# Patient Record
Sex: FEMALE | Race: WHITE | NOT HISPANIC OR LATINO
[De-identification: names, ages, dates, MRNs, and addresses within clinical notes are randomized per-mention and may not be internally consistent; named-entity substitution may affect disease eponyms.]

---

## 2018-03-20 PROBLEM — Z00.00 ENCOUNTER FOR PREVENTIVE HEALTH EXAMINATION: Status: ACTIVE | Noted: 2018-03-20

## 2018-03-21 ENCOUNTER — APPOINTMENT (OUTPATIENT)
Dept: PULMONOLOGY | Facility: CLINIC | Age: 33
End: 2018-03-21
Payer: COMMERCIAL

## 2018-03-21 VITALS
HEART RATE: 84 BPM | DIASTOLIC BLOOD PRESSURE: 80 MMHG | SYSTOLIC BLOOD PRESSURE: 120 MMHG | BODY MASS INDEX: 27.6 KG/M2 | OXYGEN SATURATION: 97 % | WEIGHT: 150 LBS | HEIGHT: 62 IN | TEMPERATURE: 97.3 F

## 2018-03-21 DIAGNOSIS — Z78.9 OTHER SPECIFIED HEALTH STATUS: ICD-10-CM

## 2018-03-21 DIAGNOSIS — R09.1 PLEURISY: ICD-10-CM

## 2018-03-21 PROCEDURE — 99242 OFF/OP CONSLTJ NEW/EST SF 20: CPT | Mod: 25

## 2018-03-21 PROCEDURE — 94010 BREATHING CAPACITY TEST: CPT

## 2018-03-21 RX ORDER — DICLOFENAC SODIUM 50 MG/1
50 TABLET, DELAYED RELEASE ORAL
Qty: 14 | Refills: 0 | Status: ACTIVE | COMMUNITY
Start: 2018-03-21 | End: 1900-01-01

## 2018-04-04 ENCOUNTER — APPOINTMENT (OUTPATIENT)
Dept: PULMONOLOGY | Facility: CLINIC | Age: 33
End: 2018-04-04
Payer: COMMERCIAL

## 2018-04-04 VITALS
WEIGHT: 150 LBS | SYSTOLIC BLOOD PRESSURE: 120 MMHG | DIASTOLIC BLOOD PRESSURE: 80 MMHG | OXYGEN SATURATION: 97 % | HEIGHT: 62 IN | HEART RATE: 84 BPM | BODY MASS INDEX: 27.6 KG/M2

## 2018-04-04 PROCEDURE — 99213 OFFICE O/P EST LOW 20 MIN: CPT | Mod: 25

## 2018-04-04 PROCEDURE — 94010 BREATHING CAPACITY TEST: CPT

## 2018-04-04 PROCEDURE — 71046 X-RAY EXAM CHEST 2 VIEWS: CPT

## 2019-02-27 LAB — DEPRECATED D DIMER PPP IA-ACNC: <150 NG/ML DDU

## 2019-09-13 ENCOUNTER — RESULT REVIEW (OUTPATIENT)
Age: 34
End: 2019-09-13

## 2019-11-18 ENCOUNTER — APPOINTMENT (OUTPATIENT)
Dept: ORTHOPEDIC SURGERY | Facility: CLINIC | Age: 34
End: 2019-11-18
Payer: COMMERCIAL

## 2019-11-18 VITALS — HEIGHT: 62 IN | BODY MASS INDEX: 26.68 KG/M2 | WEIGHT: 145 LBS

## 2019-11-18 DIAGNOSIS — Z72.3 LACK OF PHYSICAL EXERCISE: ICD-10-CM

## 2019-11-18 DIAGNOSIS — M54.5 LOW BACK PAIN: ICD-10-CM

## 2019-11-18 PROCEDURE — 99203 OFFICE O/P NEW LOW 30 MIN: CPT

## 2019-11-18 PROCEDURE — 72100 X-RAY EXAM L-S SPINE 2/3 VWS: CPT

## 2019-11-18 RX ORDER — TRAMADOL HYDROCHLORIDE 50 MG/1
50 TABLET, COATED ORAL
Qty: 20 | Refills: 0 | Status: ACTIVE | COMMUNITY
Start: 2019-09-12

## 2019-11-18 RX ORDER — DICLOFENAC SODIUM 75 MG/1
75 TABLET, DELAYED RELEASE ORAL
Qty: 60 | Refills: 1 | Status: ACTIVE | COMMUNITY
Start: 2019-11-18 | End: 1900-01-01

## 2019-11-18 RX ORDER — NAPROXEN 500 MG/1
500 TABLET ORAL
Qty: 30 | Refills: 0 | Status: ACTIVE | COMMUNITY
Start: 2019-09-12

## 2019-11-18 RX ORDER — FLUCONAZOLE 150 MG/1
150 TABLET ORAL
Qty: 6 | Refills: 0 | Status: ACTIVE | COMMUNITY
Start: 2019-06-28

## 2019-11-18 NOTE — ASSESSMENT
[FreeTextEntry1] : 34 year old female with low back pain.  She has no radicular pain and is neurologically intact.  Her MRI Lspine did not show any abnormalities.  Her lumbar radiographs did not show any dynamic instability.  She has not been to physical therapy.  We provided her with a referral for physical therapy.  We also provided her with a prescription for diclofenac.  She knows not to take any other NSAIDs with diclofenac and discontinue it if GI symptoms occur.  All her questions were answered.  She will call to make a followup appointment. She knows to call if she has any questions or concerns or if her symptoms acutely worsen.

## 2019-11-18 NOTE — PHYSICAL EXAM
[de-identified] : General: No acute distress, conversant, well-nourished.\par Head: Normocephalic, atraumatic\par Neck: trachea midline, FROM\par Heart: normotensive and normal rate and rhythm\par Lungs: No labored breathing\par Skin: No abrasions, no rashes, no edema\par Psych: Alert and oriented to person, place and time\par Extremities: no peripheral edema or digital cyanosis\par Gait: Normal gait. Can perform tandem gait.  \par Vascular: warm and well perfused distally, palpable distal pulses\par \par MSK:\par Lumbar spine:\par Alignment normal.\par Tenderness to palpation in low back.  No step-off, no deformity.\par Low back pain but no neurologic symptoms with full active range of motion of lumbar spine (flexion, extension, lateral bending and rotation).\par \par NEURO EXAM:\par Sensation \par Left L2  -  2/2            \par Left L3  -  2/2\par Left L4  -  2/2\par Left L5  -  2/2\par Left S1  -  2/2\par \par Right L2  -  2/2            \par Right L3  -  2/2\par Right L4  -  2/2\par Right L5  -  2/2\par Right S1  -  2/2\par \par Motor: \par Left L2 (hip flexion)                            5/5                \par Left L3 (knee extension)                   5/5                \par Left L4 (ankle dosiflexion)                 5/5                \par Left L5 (long toe extensor)                5/5                \par Left S1 (ankle plantar flexion)           5/5\par \par Right L2 (hip flexion)                            5/5                \par Right L3 (knee extension)                   5/5                \par Right L4 (ankle dosiflexion)                 5/5                \par Right L5 (long toe extensor)                5/5                \par Right S1 (ankle plantar flexion)           5/5\par \par Reflexes: Normal and symmetric\par Negative clonus.  Down-going Babinski.  \par \par  [de-identified] : Lumbar radiographs including flexion and extension views obtained in the office today shows no fracture or dislocation.  There is no evidence of dynamic instability.  \par \par MRI L spine (9/11/19): No disc herniations, no canal or foraminal narrowing.

## 2019-11-18 NOTE — HISTORY OF PRESENT ILLNESS
[de-identified] : 34 year old female presents with low back pain that is interfering with her ability to sleep. It feels better when she stands but worse when she lays down.  She says, "It feels like my spine is going to snap."  She denies any radicular pain, numbness, paresthesias, weakness, balance problems or urinary symptoms. She was previously seen by Dr. Winn and at that time she did have some paresthesias in her right leg but those have completely resolved.  She has been taking naproxen which doesn't help.  She tried tramadol which doesn't help.

## 2019-11-18 NOTE — HISTORY OF PRESENT ILLNESS
[de-identified] : 34 year old female presents with low back pain that is interfering with her ability to sleep. It feels better when she stands but worse when she lays down.  She says, "It feels like my spine is going to snap."  She denies any radicular pain, numbness, paresthesias, weakness, balance problems or urinary symptoms. She was previously seen by Dr. Winn and at that time she did have some paresthesias in her right leg but those have completely resolved.  She has been taking naproxen which doesn't help.  She tried tramadol which doesn't help.

## 2024-06-20 ENCOUNTER — APPOINTMENT (OUTPATIENT)
Dept: ULTRASOUND IMAGING | Facility: CLINIC | Age: 39
End: 2024-06-20

## 2024-06-20 ENCOUNTER — APPOINTMENT (OUTPATIENT)
Dept: MAMMOGRAPHY | Facility: CLINIC | Age: 39
End: 2024-06-20
Payer: COMMERCIAL

## 2024-06-20 PROCEDURE — 77063 BREAST TOMOSYNTHESIS BI: CPT | Mod: 59

## 2024-06-20 PROCEDURE — 77065 DX MAMMO INCL CAD UNI: CPT | Mod: GG,RT

## 2024-06-20 PROCEDURE — 77067 SCR MAMMO BI INCL CAD: CPT | Mod: 59

## 2024-06-20 PROCEDURE — 76641 ULTRASOUND BREAST COMPLETE: CPT | Mod: 50

## 2024-10-03 ENCOUNTER — APPOINTMENT (OUTPATIENT)
Dept: ORTHOPEDIC SURGERY | Facility: CLINIC | Age: 39
End: 2024-10-03
Payer: SELF-PAY

## 2024-10-03 DIAGNOSIS — M94.262 CHONDROMALACIA, LEFT KNEE: ICD-10-CM

## 2024-10-03 PROCEDURE — 73562 X-RAY EXAM OF KNEE 3: CPT | Mod: LT

## 2024-10-03 PROCEDURE — 99203 OFFICE O/P NEW LOW 30 MIN: CPT

## 2024-10-03 RX ORDER — NABUMETONE 500 MG/1
500 TABLET, FILM COATED ORAL
Qty: 60 | Refills: 1 | Status: ACTIVE | COMMUNITY
Start: 2024-10-03 | End: 1900-01-01

## 2024-10-03 NOTE — PHYSICAL EXAM
[de-identified] : Left knee  Constitutional:  The patient is healthy-appearing and in no apparent distress.   Gait: The patient ambulates with a normal gait and no limp.  Cardiovascular System:  The capillary refill is less than 2 seconds.   Skin:  There are no skin abnormalities.  Left Knee:   Bony Palpation:  There is no tenderness of the medial joint line.  There is no tenderness of the lateral joint line. There is no tenderness of the medial femoral chondyle. There is no tenderness of the lateral femoral chondyle. There is no tenderness of the tibial tubercle. There is no tenderness of the superior patella. There is no tenderness of the inferior patella. There is tenderness of the medial patellar facet. There is tenderness of the lateral patellar facet.  Soft Tissue Palpation:  There is no tenderness of the medial retinaculum. There is no tenderness of the lateral retinaculum. There is no tenderness of the quadriceps tendon. There is no tenderness of the patella tendon. There is no tenderness of the ITB. There is no tenderness of the pes anserine.  Active Range of Motion:  The range of motion at the knee actively and passively is full.  There is lateral patellar retinaculum tightness/hypomobility.  Special Tests:  There is a negative Apley. There is a negative Steinmanns.  There is a negative Lachman and Anterior Drawer. There is a negative Posterior Drawer.   There is no varus or valgus laxity.  Strength:  There is 5/5 hip flexion and 5/5 knee flexion and extension.    Psychiatric:  The patient demonstrates a normal mood and affect and is active and alert.  Alignment: There is external tibial rotation and femoral anteversion.      [de-identified] : X-ray left knee: There is no significant bony / soft tissue abnormality, arthritis, or fracture except mild patellar tilt

## 2024-10-03 NOTE — HISTORY OF PRESENT ILLNESS
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

## 2024-10-03 NOTE — ASSESSMENT
[FreeTextEntry1] : Discussed at length with patient exam history and imaging as well as treatment options and at this time patient elects home exercises and observation if no symptom improvement she will follow-up on as-needed basis

## 2025-04-11 NOTE — PHYSICAL EXAM
Chief Complaint  Follow-up and Pain (Patient states she has pain in left  shoulder, left knee and left  heel. )    Subjective        Pauline Sung presents to [facility]  History of Present Illness  The patient presents for evaluation of plantar fasciitis, lupus, and health maintenance.    She reports a recurrence of heel pain, which she attributes to her long-standing diagnosis of plantar fasciitis. The pain is severe enough to disrupt her gait during bathroom visits. She has been managing this condition for approximately 10 years, with previous treatments including injections from Dr. Parrish and a podiatrist, which provided temporary relief. She also mentions a history of cellulitis 12 to 13 years ago, which has since led to ongoing issues.    She expresses concern about potential lupus flare-ups, as suggested by her daughter, and has been using [facility] for relief. She also reports shoulder pain, which she suspects may be due to sleeping in an awkward position. Additionally, she experiences knee pain.    She has undergone several mammograms and plans to repeat the procedure in a few years. She retains her uterus and believes her last Pap smear was conducted in 2021. She is due for a colonoscopy this year. She is currently on a regimen of baby aspirin (81 mg) and uses a heating pad for comfort.    FAMILY HISTORY  Her aunt  of breast cancer. Her sister had breast cancer. She has 2 nieces who had breast cancer and underwent breast removal.    MEDICATIONS  Current: levothyroxine, atorvastatin, baby aspirin        Medical History: has a past medical history of Allergic, Cellulitis, Cellulitis, Cerebral vascular disease, Family history of thyroid problem, Gall stones, Hypothyroidism, Kidney stone, Lupus, Migraines, Plantar fasciitis, and Thrombosis.   Surgical History: has a past surgical history that includes Cholecystectomy; Appendectomy; Colonoscopy (2019);  "Colonoscopy (N/A, 12/01/2022); and Mohs surgery (05/20/2024).   Family History: family history includes Cancer in her brother, brother, father, sister, and sister; Clotting disorder in her father; Colon cancer in her brother; Diabetes in her brother; Diverticulitis in her brother; Heart disease in her brother, father, and sister; No Known Problems in her daughter, daughter, maternal grandfather, maternal grandmother, mother, paternal grandfather, paternal grandmother, son, son, and son; Other in her brother, brother, sister, and sister.   Social History: reports that she has never smoked. She has never been exposed to tobacco smoke. She has never used smokeless tobacco. She reports that she does not drink alcohol and does not use drugs.  Immunization History   Administered Date(s) Administered    ABRYSVO (RSV, 60+ or pregnant women 32-36 wks) 10/02/2023    COVID-19 (MODERNA) 12YRS+ (SPIKEVAX) 10/02/2023, 09/25/2024    COVID-19 (MODERNA) 1st,2nd,3rd Dose Monovalent 03/03/2021, 03/31/2021, 12/02/2021    COVID-19 (MODERNA) BIVALENT 12+YRS 11/02/2022    Covid-19 (Pfizer) Gray Cap Monovalent 06/01/2022    FLUAD TRI 65YR+ 10/10/2019, 10/10/2019    Flu Vaccine Intradermal Quad 18-64YR 10/10/2017    Fluad Quad 65+ 10/10/2019, 10/13/2020, 10/11/2022    Fluzone High-Dose 65+YRS 09/25/2024    Fluzone High-Dose 65+yrs 10/21/2021, 10/03/2022, 10/02/2023    Hep B, Unspecified 05/06/1996    Pneumococcal Conjugate 13-Valent (PCV13) 01/05/2016    Pneumococcal Conjugate 20-Valent (PCV20) 07/15/2022    Pneumococcal Polysaccharide (PPSV23) 12/07/2010    Shingrix 05/15/2021, 08/16/2021, 08/16/2021    Tdap 10/05/2012, 08/04/2022    Zostavax 08/11/2014    flucelvax quad pfs =>4 YRS 09/17/2018    influenza Split 10/01/2016       Objective   Vital Signs:  /78   Pulse 71   Temp 97.9 °F (36.6 °C) (Oral)   Ht 170.2 cm (67\")   Wt 94.9 kg (209 lb 3.2 oz)   SpO2 96%   BMI 32.77 kg/m²   Estimated body mass index is 32.77 kg/m² as " "calculated from the following:    Height as of this encounter: 170.2 cm (67\").    Weight as of this encounter: 94.9 kg (209 lb 3.2 oz).             ROS:  Review of Systems   Constitutional:  Negative for fatigue and fever.   HENT:  Negative for congestion, ear pain and sinus pressure.    Respiratory:  Negative for cough, chest tightness and shortness of breath.    Cardiovascular:  Negative for chest pain, palpitations and leg swelling.   Gastrointestinal:  Negative for abdominal pain and diarrhea.   Genitourinary:  Negative for dysuria and frequency.   Neurological:  Negative for speech difficulty, headache and confusion.   Psychiatric/Behavioral:  Negative for agitation and behavioral problems.       Physical Exam  Vitals reviewed.   Constitutional:       Appearance: Normal appearance.   HENT:      Right Ear: Tympanic membrane normal.      Left Ear: Tympanic membrane normal.      Nose: Nose normal.   Eyes:      Extraocular Movements: Extraocular movements intact.      Conjunctiva/sclera: Conjunctivae normal.      Pupils: Pupils are equal, round, and reactive to light.   Cardiovascular:      Rate and Rhythm: Normal rate and regular rhythm.   Pulmonary:      Effort: Pulmonary effort is normal.      Breath sounds: Normal breath sounds.   Abdominal:      General: Bowel sounds are normal.   Musculoskeletal:         General: Normal range of motion.      Cervical back: Normal range of motion.   Skin:     General: Skin is warm and dry.   Neurological:      General: No focal deficit present.      Mental Status: She is alert and oriented to person, place, and time.   Psychiatric:         Mood and Affect: Mood normal.         Behavior: Behavior normal.       Physical Exam  Lungs are clear.  Heart is steady with no skips.      Result Review     The following data was reviewed by: Ana Chapman MD on 04/11/2025:  Common labs          6/10/2024    14:28 12/12/2024    11:39 4/11/2025    13:19   Common Labs   Glucose 88  105  " [de-identified] : Lumbar radiographs including flexion and extension views obtained in the office today shows no fracture or dislocation.  There is no evidence of dynamic instability.  \par \par MRI L spine (9/11/19): No disc herniations, no canal or foraminal narrowing.   97    BUN 19  21  23    Creatinine 0.59  0.84  0.89    Sodium 141  140  140    Potassium 3.9  4.1  4.9    Chloride 111  111  109    Calcium 8.9  9.4  9.8    Albumin 3.9  4.0  4.2    Total Bilirubin <0.2  0.3  0.3    Alkaline Phosphatase 92  91  83    AST (SGOT) 27  23  23    ALT (SGPT) 32  20  21    WBC  5.18     Hemoglobin  13.2     Hematocrit  40.9     Platelets  189     Total Cholesterol  122     Triglycerides  123     HDL Cholesterol  51     LDL Cholesterol   49     Hemoglobin A1C 5.60        Results  Laboratory Studies  Blood sugar was 105. Cholesterol levels were normal. Thyroid function was normal. CBC for anemia was normal.    Imaging  X-ray of foot showed no heel spur.             Assessment and Plan     Diagnoses and all orders for this visit:    1. Plantar fasciitis, left (Primary)    2. Arthritis  -     diclofenac (VOLTAREN) 75 MG EC tablet; Take 1 tablet by mouth 2 (Two) Times a Day As Needed (arthritis) for up to 30 days.  Dispense: 30 tablet; Refill: 0    3. Mixed hyperlipidemia  -     Comprehensive Metabolic Panel    4. Hypothyroidism, unspecified type  -     levothyroxine (SYNTHROID, LEVOTHROID) 112 MCG tablet; Take 1 tablet by mouth Every Morning.  Dispense: 90 tablet; Refill: 3  -     Comprehensive Metabolic Panel    Other orders  -     atorvastatin (LIPITOR) 40 MG tablet; Take 1 tablet by mouth Every Night.  Dispense: 90 tablet; Refill: 3      Assessment & Plan  1. Plantar Fasciitis.  The patient reports persistent heel pain, which has been managed with steroid injections in the past, providing relief for a couple of years each time. An x-ray of the foot showed no heel spur. A referral will be made for another steroid injection to manage the current flare-up.    2. Lupus.  The patient experiences joint pain, likely due to a lupus flare-up exacerbated by the weather. Diclofenac will be prescribed to manage inflammation and pain. She is advised to avoid taking aspirin concurrently with diclofenac.  [de-identified] : General: No acute distress, conversant, well-nourished.\par Head: Normocephalic, atraumatic\par Neck: trachea midline, FROM\par Heart: normotensive and normal rate and rhythm\par Lungs: No labored breathing\par Skin: No abrasions, no rashes, no edema\par Psych: Alert and oriented to person, place and time\par Extremities: no peripheral edema or digital cyanosis\par Gait: Normal gait. Can perform tandem gait.  \par Vascular: warm and well perfused distally, palpable distal pulses\par \par MSK:\par Lumbar spine:\par Alignment normal.\par Tenderness to palpation in low back.  No step-off, no deformity.\par Low back pain but no neurologic symptoms with full active range of motion of lumbar spine (flexion, extension, lateral bending and rotation).\par \par NEURO EXAM:\par Sensation \par Left L2  -  2/2            \par Left L3  -  2/2\par Left L4  -  2/2\par Left L5  -  2/2\par Left S1  -  2/2\par \par Right L2  -  2/2            \par Right L3  -  2/2\par Right L4  -  2/2\par Right L5  -  2/2\par Right S1  -  2/2\par \par Motor: \par Left L2 (hip flexion)                            5/5                \par Left L3 (knee extension)                   5/5                \par Left L4 (ankle dosiflexion)                 5/5                \par Left L5 (long toe extensor)                5/5                \par Left S1 (ankle plantar flexion)           5/5\par \par Right L2 (hip flexion)                            5/5                \par Right L3 (knee extension)                   5/5                \par Right L4 (ankle dosiflexion)                 5/5                \par Right L5 (long toe extensor)                5/5                \par Right S1 (ankle plantar flexion)           5/5\par \par Reflexes: Normal and symmetric\par Negative clonus.  Down-going Babinski.  \par \par  The prescription will be sent to [facility].    3. Health Maintenance.  The patient is due for a Pap smear, as the last one was in May 2021. A referral will be made for a Pap smear. She is also due for a colonoscopy this year, as the last one was in 2022 and she had polyps. A referral will be made to Dr. Reilly for the colonoscopy. A bone density scan will be scheduled to assess bone health, as the last one was in 2021 and showed some thinning in the spine. Laboratory tests will be conducted today to monitor kidney function and check for anemia.    4. Weight management.  The patient expressed interest in weight loss medication samples. A prescription for Wegovy or Zepbound will be sent to [facility] to see if it can be approved.    5. Medication Management.  Levothyroxine and atorvastatin prescriptions will be sent to [facility].    Follow-up  The patient will follow up in 6 months.       I spent 35 minutes caring for Pauline on this date of service. This time includes time spent by me in the following activities:reviewing tests  Follow Up  Return in about 6 months (around 10/11/2025).  Patient was given instructions and counseling regarding her condition or for health maintenance advice. Please see specific information pulled into the AVS if appropriate.   Patient or patient representative verbalized consent for the use of Ambient Listening during the visit with  Ana Chapman MD for chart documentation. 4/27/2025  12:45 EDT    Ana Chapman MD

## 2025-04-28 ENCOUNTER — APPOINTMENT (OUTPATIENT)
Dept: ORTHOPEDIC SURGERY | Facility: CLINIC | Age: 40
End: 2025-04-28
Payer: SELF-PAY

## 2025-04-28 DIAGNOSIS — M25.371 OTHER INSTABILITY, RIGHT ANKLE: ICD-10-CM

## 2025-04-28 PROCEDURE — 99213 OFFICE O/P EST LOW 20 MIN: CPT

## 2025-04-28 PROCEDURE — 73610 X-RAY EXAM OF ANKLE: CPT | Mod: RT

## 2025-05-01 RX ORDER — NABUMETONE 500 MG/1
500 TABLET, FILM COATED ORAL
Qty: 60 | Refills: 0 | Status: ACTIVE | COMMUNITY
Start: 2025-05-01 | End: 1900-01-01